# Patient Record
Sex: MALE | Race: WHITE | NOT HISPANIC OR LATINO | Employment: OTHER | RURAL
[De-identification: names, ages, dates, MRNs, and addresses within clinical notes are randomized per-mention and may not be internally consistent; named-entity substitution may affect disease eponyms.]

---

## 2021-10-01 DIAGNOSIS — C67.9 PRIMARY MALIGNANT NEOPLASM OF BLADDER: Primary | ICD-10-CM

## 2021-10-28 ENCOUNTER — PROCEDURE VISIT (OUTPATIENT)
Dept: UROLOGY | Facility: CLINIC | Age: 71
End: 2021-10-28
Payer: MEDICARE

## 2021-10-28 VITALS
WEIGHT: 216 LBS | SYSTOLIC BLOOD PRESSURE: 131 MMHG | DIASTOLIC BLOOD PRESSURE: 81 MMHG | BODY MASS INDEX: 26.86 KG/M2 | HEIGHT: 75 IN | HEART RATE: 77 BPM

## 2021-10-28 DIAGNOSIS — C67.9 PRIMARY MALIGNANT NEOPLASM OF BLADDER: Primary | ICD-10-CM

## 2021-10-28 DIAGNOSIS — Z12.5 SCREENING FOR PROSTATE CANCER: ICD-10-CM

## 2021-10-28 PROCEDURE — 52000 CYSTOURETHROSCOPY: CPT | Mod: PBBFAC | Performed by: UROLOGY

## 2021-10-28 PROCEDURE — 52000 PR CYSTOURETHROSCOPY: ICD-10-PCS | Mod: S$PBB,,, | Performed by: UROLOGY

## 2021-10-28 PROCEDURE — 52000 CYSTOURETHROSCOPY: CPT | Mod: S$PBB,,, | Performed by: UROLOGY

## 2021-10-28 RX ORDER — ASPIRIN 325 MG
325 TABLET ORAL DAILY
COMMUNITY

## 2021-10-28 RX ORDER — LISINOPRIL 20 MG/1
20 TABLET ORAL DAILY
COMMUNITY

## 2021-10-28 RX ORDER — LIDOCAINE HYDROCHLORIDE 20 MG/ML
JELLY TOPICAL
Status: COMPLETED | OUTPATIENT
Start: 2021-10-28 | End: 2021-10-28

## 2021-10-28 RX ORDER — LEVOTHYROXINE SODIUM 137 UG/1
137 TABLET ORAL
COMMUNITY
Start: 2021-04-05

## 2021-10-28 RX ORDER — ROSUVASTATIN CALCIUM 10 MG/1
20 TABLET, COATED ORAL DAILY
COMMUNITY
Start: 2021-09-18

## 2021-10-28 RX ORDER — METFORMIN HYDROCHLORIDE 500 MG/1
1000 TABLET ORAL 2 TIMES DAILY WITH MEALS
COMMUNITY

## 2021-10-28 RX ORDER — CARVEDILOL 6.25 MG/1
6.25 TABLET ORAL 2 TIMES DAILY
COMMUNITY

## 2021-10-28 RX ADMIN — LIDOCAINE HYDROCHLORIDE: 20 JELLY TOPICAL at 01:10

## 2021-10-29 DIAGNOSIS — M47.816 LUMBAR SPONDYLOSIS: Primary | ICD-10-CM

## 2021-11-03 ENCOUNTER — CLINICAL SUPPORT (OUTPATIENT)
Dept: REHABILITATION | Facility: HOSPITAL | Age: 71
End: 2021-11-03
Payer: MEDICARE

## 2021-11-03 DIAGNOSIS — M47.816 LUMBAR SPONDYLOSIS: ICD-10-CM

## 2021-11-03 PROCEDURE — 97110 THERAPEUTIC EXERCISES: CPT | Mod: PN

## 2021-11-03 PROCEDURE — 97161 PT EVAL LOW COMPLEX 20 MIN: CPT | Mod: PN

## 2021-11-12 ENCOUNTER — CLINICAL SUPPORT (OUTPATIENT)
Dept: REHABILITATION | Facility: HOSPITAL | Age: 71
End: 2021-11-12
Payer: MEDICARE

## 2021-11-12 DIAGNOSIS — M47.816 LUMBAR SPONDYLOSIS: Primary | ICD-10-CM

## 2021-11-12 PROCEDURE — 97110 THERAPEUTIC EXERCISES: CPT | Mod: PN,CQ

## 2022-10-31 ENCOUNTER — PROCEDURE VISIT (OUTPATIENT)
Dept: UROLOGY | Facility: CLINIC | Age: 72
End: 2022-10-31
Payer: MEDICARE

## 2022-10-31 VITALS
BODY MASS INDEX: 26.73 KG/M2 | HEIGHT: 75 IN | DIASTOLIC BLOOD PRESSURE: 71 MMHG | WEIGHT: 215 LBS | SYSTOLIC BLOOD PRESSURE: 126 MMHG | HEART RATE: 65 BPM

## 2022-10-31 DIAGNOSIS — C67.9 PRIMARY MALIGNANT NEOPLASM OF BLADDER: Primary | ICD-10-CM

## 2022-10-31 DIAGNOSIS — Z12.5 SCREENING PSA (PROSTATE SPECIFIC ANTIGEN): ICD-10-CM

## 2022-10-31 DIAGNOSIS — N41.1 CHRONIC PROSTATITIS: ICD-10-CM

## 2022-10-31 DIAGNOSIS — N40.1 BENIGN PROSTATIC HYPERPLASIA WITH URINARY OBSTRUCTION: ICD-10-CM

## 2022-10-31 DIAGNOSIS — N13.8 BENIGN PROSTATIC HYPERPLASIA WITH URINARY OBSTRUCTION: ICD-10-CM

## 2022-10-31 PROCEDURE — 52000 CYSTOURETHROSCOPY: CPT | Mod: S$PBB,,, | Performed by: UROLOGY

## 2022-10-31 PROCEDURE — 52000 CYSTOURETHROSCOPY: CPT | Mod: PBBFAC | Performed by: UROLOGY

## 2022-10-31 PROCEDURE — 52000 PR CYSTOURETHROSCOPY: ICD-10-PCS | Mod: S$PBB,,, | Performed by: UROLOGY

## 2022-10-31 RX ORDER — LIDOCAINE HYDROCHLORIDE 20 MG/ML
JELLY TOPICAL
Status: DISCONTINUED | OUTPATIENT
Start: 2022-10-31 | End: 2022-10-31

## 2022-10-31 RX ORDER — LIDOCAINE HYDROCHLORIDE 20 MG/ML
JELLY TOPICAL
Status: COMPLETED | OUTPATIENT
Start: 2022-10-31 | End: 2022-10-31

## 2022-10-31 RX ADMIN — LIDOCAINE HYDROCHLORIDE 10 ML: 20 JELLY TOPICAL at 04:10

## 2022-10-31 NOTE — PROCEDURES
Procedures    History of Present Illness  Mr. Ybarra is 65 years old. Sent to me by Dr. Dean Marrufo because of terminal hematuria that has been intermittent for the last couple months. He has had no previous problems with urinary tract infections. He did receive antibiotics empirically and seemed to improve with symptoms of slight dysuria and hematuria when he was taking them. He saw a little blood a few days after he completed antibiotics and has seen none recently. He has no history of prostatitis. Was found to have a stone on x-ray but has never had a clinical stone problem. He has had bilateral testicular pain or discomfort for the last few months also, that has occurred approximately during the same time he has had the hematuria. This has not been associated necessarily with activity. Sometimes it hurts in both testicles at the same time and other times only on the right side. This is also intermittent pain. No history of other  problems. IPSS score totals 3-4 with nocturia one to 2 times nightly.  [July 2, 2015]     Mr. Ybarra underwent TURBTs on July 9. He did very well following discharge and really did not have any significant bleeding. He started having worsening dysuria sometime after discharge and is still having dysuria. He is also having some pain in the suprapubic area. 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 He is chronically uncomfortable in the area overlying his pubis and has some dysuria each time he voids. Pathology report revealed that his tumors were all grade 1 lesions. For that reason I think we will hold off on BCG until we do his first cystoscopy. It may be that we can get by without BCG yet.  Patient is no longer on Farxiga.  [August 17, 2015]     Rev. Ybarra is in for first cystoscopy since the tumor removal which was done on July 9. All of his lesions were grade 1 lesions without invasion. He is in for cystoscopy and doing well clinically. He is only on  metformin 4 tablets daily and hemoglobin A1c has come down to 6.9. He is not on any additional agent at this time but sugars been doing well. No new health problems.  [October 26, 2015]     Rev. Ybarra is in for the second cystoscopy since the original surgery. He had some small lesions removed last time that were felt to be grade 2 TCC. He's had no visible blood in his urine but has had a little discomfort in the suprapubic area on an intermittent basis for the last few weeks. This could've been related to some strenuous activity. The discomfort is not related to voiding and is not having any real trouble voiding.  [February 1, 2016]     Rev. Ybarra is in for his next cystoscopy and continues to do very well. No visible blood in the urine and no further discomfort in the suprapubic area. He's had no new health issues since his last visit.  [May 23, 2016]     Rev. Ybarra is in for cystoscopy and follow-up because of previous TCC urinary bladder. He has had no visible blood in his urine and has had no increased trouble with urination. No further suprapubic pain. Generally doing well and just in for the cystoscopy.  [September 19, 2016]     Rev. Ybarra has been doing well since he was here in September. In for cystoscopy and follow-up of previous TCC urinary bladder. He had a short bout of suprapubic pain that cleared spontaneously. Otherwise doing well. No visible blood or other suggestion of  problems.  [February 13, 2017]     Rev. Ybarra is in for his 6 month cystoscopy. Clinically doing well without any new health issues. 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 [August 7, 2017]     Rev. Ybarra is in for six-month cystoscopy. He is not aware of any problems. Has not seen any blood in his urine and has had no increased trouble with bladder outlet obstructive symptoms. 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  Overall doing very well.  [February 12, 2018]     Rev. Ybarra is in for six-month follow-up with cystoscopy. He does have a lot of other health issues since he was last seen including thyroid nodule. This was aspirated week ago but he has not heard any results from that aspiration yet. Patient also has had some double vision and scheduled see a neurosurgeon at [facility] for that. No new  complaints. No visible blood in the urine or other problems.  [August 13, 2018]     Rev. Ybarra was brought in for six-month cystoscopic examination. Clinically doing okay with no visible blood in the urine and no worsening bladder outlet obstructive symptoms etc. Only significant health issues he had since he was seen last time was back surgery for a cyst causing pressure on his sciatic nerves. Doing much better with his back now since the surgery was done November 27. Here for cystoscopy. [February 20, 2019]     Rev. Ybarra is doing satisfactory after the bladder tumors were fulgurated on February 28. He is still having some discomfort and symptoms did not clear with Cipro. Still has urgency and has nocturia 2-3 times nightly. He feels that he is still having some problems related to the surgery. No visible blood. [April 4, 2019]     Rev. Ybarra has had no visible blood in the urine and no major dysuria. He is still taking Azo and Prelief as he never stopped either one after he got over the last procedure. No new clinical problems. Here for follow-up cystoscopy. [July 8, 2019]     Rev. Ybarra is in for his 4 month follow-up with cystoscopy. Nothing to suggest active problems with prostate or  to suggest recurrent TCC of bladder. Voiding reasonably well. Also for yearly screening of prostate cancer. [November 11, 2019]     Rev. Ybarra returned today for cystoscopy because of previous TCCs urinary bladder. Clinically doing well without any new issues. [March 19, 2020]     Rev. Ybarra returned today for cystoscopy for follow-up  of previous TCC's of urinary bladder. Doing okay with no visible blood in urine and no new clinical problems. [July 23, 2020]     RevFroy Ybarra returned today for cystoscopy for bladder tumor follow-up. PSA done also. Overall good 6 months without any new problems. Weight is exactly the same as 6 months ago.  [January 28, 2021]     PSA was 1.010 on October 31, 2022  PSA was 0.929 on January 28, 2021  PSA was 0.749 on November 11, 2019  PSA was 0.733 on August 13, 2018  PSA was 1.070 on February 13, 2017  PSA was 1.100 on February 1, 2016  ----------------------------------------------------------------------------------------------------------------------------------------------------------------------------------------------------------------------------------------------------------------------------------------------------------    Cystoscopy  note     Preoperative diagnosis:  Previous transitional cell carcinomas of urinary bladder; for bladder tumor recheck     Postoperative diagnosis:  Same with no  recurrence of TCCs of urinary bladder     Description:  The patient was placed in the supine position in the clinic cystoscopy room and prepared for flexible cystoscopy. Urethra was anesthetized with lidocaine jelly. No sedation was given. The 16.5 Dominican Olympus flexible digital cystoscope was passed transurethrally into the bladder. Anterior urethra was clear. The posterior urethra had early trilobar enlargement with  partial obstruction present. There was a lot of inflammatory change noted involving the whole prostate as previously mentioned.  The prostate was about 3.0 cm total length with the functional length about 2 cm in length. The bladder was entered and the  urine aspirated. The bladder was refilled with sterile water. There was no evidence of tumor recurrence or other significant bladder lesions.  There was a small hemangioma at the dome of the bladder. There were no significant erythematous areas but there  were a few inflammatory looking areas along with the scarring  and I did not see any areas that appeared to be suspicious. Ureteral orifices were in  normal position with bilateral clear efflux. Both orifices appeared that they may have been previously resected.  Retrograde view of the bladder neck revealed minimal intravesical extension of prostatic tissue and orifices appeared normal. No other real abnormalities were noted. The bladder was then viewed with the NBI and I did not see any suspicious lesions. There was less erythematous area adjacent to the area of the original tumor on left anterolateral wall than previously described. Scope was removed.  Digital rectal exam revealed a 20 g symmetrical firm benign gland.  Patient  was allowed to return to a regular exam room in satisfactory condition. There were no complications.      -----------------------------------------------------------------------------------------------------------------------------------------------------------

## 2022-11-01 NOTE — PATIENT INSTRUCTIONS
No recurrence of TCC of urinary bladder.  Prostate feels okay.  PSA  of 1.010 reported to patient before he left the office.  Return in 1 year on Monday November 6, 2023 for PSA and another cystoscopy at 1:30 p.m.  Call for problems.

## 2023-10-10 DIAGNOSIS — C67.9 PRIMARY MALIGNANT NEOPLASM OF BLADDER: Primary | ICD-10-CM

## 2023-11-06 ENCOUNTER — PROCEDURE VISIT (OUTPATIENT)
Dept: UROLOGY | Facility: CLINIC | Age: 73
End: 2023-11-06
Payer: MEDICARE

## 2023-11-06 VITALS
HEART RATE: 80 BPM | SYSTOLIC BLOOD PRESSURE: 112 MMHG | HEIGHT: 75 IN | BODY MASS INDEX: 26.86 KG/M2 | DIASTOLIC BLOOD PRESSURE: 69 MMHG | WEIGHT: 216 LBS

## 2023-11-06 DIAGNOSIS — C67.9 PRIMARY MALIGNANT NEOPLASM OF BLADDER: Primary | ICD-10-CM

## 2023-11-06 DIAGNOSIS — N40.1 BENIGN PROSTATIC HYPERPLASIA WITH URINARY OBSTRUCTION: ICD-10-CM

## 2023-11-06 DIAGNOSIS — N13.8 BENIGN PROSTATIC HYPERPLASIA WITH URINARY OBSTRUCTION: ICD-10-CM

## 2023-11-06 DIAGNOSIS — Z12.5 SCREENING PSA (PROSTATE SPECIFIC ANTIGEN): ICD-10-CM

## 2023-11-06 DIAGNOSIS — N41.1 CHRONIC PROSTATITIS: ICD-10-CM

## 2023-11-06 PROCEDURE — 52000 CYSTOURETHROSCOPY: CPT | Mod: S$PBB,,, | Performed by: UROLOGY

## 2023-11-06 PROCEDURE — 52000 CYSTOURETHROSCOPY: CPT | Mod: PBBFAC | Performed by: UROLOGY

## 2023-11-06 PROCEDURE — 52000 PR CYSTOURETHROSCOPY: ICD-10-PCS | Mod: S$PBB,,, | Performed by: UROLOGY

## 2023-11-06 RX ORDER — LIDOCAINE HYDROCHLORIDE 20 MG/ML
JELLY TOPICAL
Status: COMPLETED | OUTPATIENT
Start: 2023-11-06 | End: 2023-11-06

## 2023-11-06 RX ORDER — ISOSORBIDE MONONITRATE 30 MG/1
30 TABLET, EXTENDED RELEASE ORAL
COMMUNITY
Start: 2023-11-04

## 2023-11-06 RX ORDER — AA/PROT/LYSINE/METHIO/VIT C/B6 50-12.5 MG
TABLET ORAL
COMMUNITY

## 2023-11-06 RX ORDER — OMEPRAZOLE 40 MG/1
CAPSULE, DELAYED RELEASE ORAL
COMMUNITY
Start: 2023-10-17

## 2023-11-06 RX ADMIN — LIDOCAINE HYDROCHLORIDE 10 ML: 20 JELLY TOPICAL at 02:11

## 2023-11-06 NOTE — PROCEDURES
Procedures    History of Present Illness  Mr. Ybarra is 65 years old. Sent to me by Dr. Dean Marrufo because of terminal hematuria that has been intermittent for the last couple months. He has had no previous problems with urinary tract infections. He did receive antibiotics empirically and seemed to improve with symptoms of slight dysuria and hematuria when he was taking them. He saw a little blood a few days after he completed antibiotics and has seen none recently. He has no history of prostatitis. Was found to have a stone on x-ray but has never had a clinical stone problem. He has had bilateral testicular pain or discomfort for the last few months also, that has occurred approximately during the same time he has had the hematuria. This has not been associated necessarily with activity. Sometimes it hurts in both testicles at the same time and other times only on the right side. This is also intermittent pain. No history of other  problems. IPSS score totals 3-4 with nocturia one to 2 times nightly.  [July 2, 2015]     Mr. Ybarra underwent TURBTs on July 9. He did very well following discharge and really did not have any significant bleeding. He started having worsening dysuria sometime after discharge and is still having dysuria. He is also having some pain in the suprapubic area. 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 He is chronically uncomfortable in the area overlying his pubis and has some dysuria each time he voids. Pathology report revealed that his tumors were all grade 1 lesions. For that reason I think we will hold off on BCG until we do his first cystoscopy. It may be that we can get by without BCG yet.  Patient is no longer on Farxiga.  [August 17, 2015]     Rev. Ybarra is in for first cystoscopy since the tumor removal which was done on July 9. All of his lesions were grade 1 lesions without invasion. He is in for cystoscopy and doing well clinically. He is only on  metformin 4 tablets daily and hemoglobin A1c has come down to 6.9. He is not on any additional agent at this time but sugars been doing well. No new health problems.  [October 26, 2015]     Rev. Ybarra is in for the second cystoscopy since the original surgery. He had some small lesions removed last time that were felt to be grade 2 TCC. He's had no visible blood in his urine but has had a little discomfort in the suprapubic area on an intermittent basis for the last few weeks. This could've been related to some strenuous activity. The discomfort is not related to voiding and is not having any real trouble voiding.  [February 1, 2016]     Rev. Ybarra is in for his next cystoscopy and continues to do very well. No visible blood in the urine and no further discomfort in the suprapubic area. He's had no new health issues since his last visit.  [May 23, 2016]     Rev. Ybarra is in for cystoscopy and follow-up because of previous TCC urinary bladder. He has had no visible blood in his urine and has had no increased trouble with urination. No further suprapubic pain. Generally doing well and just in for the cystoscopy.  [September 19, 2016]     Rev. Ybarra has been doing well since he was here in September. In for cystoscopy and follow-up of previous TCC urinary bladder. He had a short bout of suprapubic pain that cleared spontaneously. Otherwise doing well. No visible blood or other suggestion of  problems.  [February 13, 2017]     Rev. Ybarra is in for his 6 month cystoscopy. Clinically doing well without any new health issues. 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 [August 7, 2017]     Rev. Ybarra is in for six-month cystoscopy. He is not aware of any problems. Has not seen any blood in his urine and has had no increased trouble with bladder outlet obstructive symptoms. 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  Overall doing very well.  [February 12, 2018]     Rev. Ybarra is in for six-month follow-up with cystoscopy. He does have a lot of other health issues since he was last seen including thyroid nodule. This was aspirated week ago but he has not heard any results from that aspiration yet. Patient also has had some double vision and scheduled see a neurosurgeon at [facility] for that. No new  complaints. No visible blood in the urine or other problems.  [August 13, 2018]     Rev. Ybarra was brought in for six-month cystoscopic examination. Clinically doing okay with no visible blood in the urine and no worsening bladder outlet obstructive symptoms etc. Only significant health issues he had since he was seen last time was back surgery for a cyst causing pressure on his sciatic nerves. Doing much better with his back now since the surgery was done November 27. Here for cystoscopy. [February 20, 2019]     Rev. Ybarra is doing satisfactory after the bladder tumors were fulgurated on February 28. He is still having some discomfort and symptoms did not clear with Cipro. Still has urgency and has nocturia 2-3 times nightly. He feels that he is still having some problems related to the surgery. No visible blood. [April 4, 2019]     Rev. Ybarra has had no visible blood in the urine and no major dysuria. He is still taking Azo and Prelief as he never stopped either one after he got over the last procedure. No new clinical problems. Here for follow-up cystoscopy. [July 8, 2019]     Rev. Ybarra is in for his 4 month follow-up with cystoscopy. Nothing to suggest active problems with prostate or  to suggest recurrent TCC of bladder. Voiding reasonably well. Also for yearly screening of prostate cancer. [November 11, 2019]     Rev. Ybarra returned today for cystoscopy because of previous TCCs urinary bladder. Clinically doing well without any new issues. [March 19, 2020]     Rev. Ybarra returned today for cystoscopy for follow-up  of previous TCC's of urinary bladder. Doing okay with no visible blood in urine and no new clinical problems. [July 23, 2020]     Rev. Ybarra returned today for cystoscopy for bladder tumor follow-up. PSA done also. Overall good 6 months without any new problems. Weight is exactly the same as 6 months ago.  [January 28, 2021]    Reverend Ybarra is had a good year without any hematuria or worsening bladder outlet obstructive symptoms.  He is voiding as well as a year ago seemingly.  Had PSA drawn and results stable.  [November 6, 2023]         PSA was 1.050 on November 6, 2023  PSA was 1.010 on October 31, 2022  PSA was 0.929 on January 28, 2021  PSA was 0.749 on November 11, 2019  PSA was 0.733 on August 13, 2018  PSA was 1.070 on February 13, 2017  PSA was 1.100 on February 1, 2016  ----------------------------------------------------------------------------------------------------------------------------------------------------------------------------------------------------------------------------------------------------------------------------------------------------------     Cystoscopy  note     Preoperative diagnosis:  Previous transitional cell carcinomas of urinary bladder; for bladder tumor recheck     Postoperative diagnosis:  Same with no  recurrence of TCCs of urinary bladder     Description:  The patient was placed in the supine position in the clinic cystoscopy room and prepared for flexible cystoscopy. Urethra was anesthetized with lidocaine jelly. No sedation was given. The 16.5 [place] Olympus flexible digital cystoscope was passed transurethrally into the bladder. Anterior urethra was clear. The posterior urethra had early trilobar enlargement with  partial obstruction present. There was a lot of inflammatory change noted involving the whole prostate as previously mentioned.  The prostate was about 3.0 cm total length with the functional length about 2 cm. The bladder was entered and the  urine  aspirated. The bladder was refilled with sterile water. There was no evidence of tumors  or other significant bladder lesions.  There was a small hemangioma at the dome of the bladder and a couple of other small hemangiomas.  There was lot of scarring of the bladder from previous tumor removal. There were no significant erythematous areas but there were a few inflammatory looking areas along with the scarring  and I did not see any areas that appeared to be suspicious. Ureteral orifices were in  normal position with bilateral clear efflux. Both orifices appeared that they may have been previously resected.  Retrograde view of the bladder neck revealed minimal intravesical extension of prostatic tissue and orifices appeared normal. No other real abnormalities were noted. The bladder was then viewed with the NBI and I did not see any suspicious lesions. Scope was removed.   Patient  was allowed to return to a regular exam room in satisfactory condition. There were no complications.

## 2023-11-06 NOTE — PATIENT INSTRUCTIONS
Cystoscopy as described.  No recurrence of TCC.  PSA returned before patient left the office and reported to him as 1.050.  Patient given Cipro 500 mg b.i.d. for 3 days to cover instrumentation.  Recommend repeat cystoscopy in 1 year.  Patient understands I plan to retire next year and at this point can not schedule him with anyone else as we do not have urologist lined up yet.  He will call next year for scheduling of cystoscopy for follow-up of previous TCC.

## 2025-04-25 DIAGNOSIS — C67.9 PRIMARY MALIGNANT NEOPLASM OF BLADDER: Primary | ICD-10-CM

## 2025-05-20 DIAGNOSIS — Z12.5 SCREENING PSA (PROSTATE SPECIFIC ANTIGEN): Primary | ICD-10-CM

## 2025-05-28 ENCOUNTER — PROCEDURE VISIT (OUTPATIENT)
Dept: UROLOGY | Facility: CLINIC | Age: 75
End: 2025-05-28
Payer: MEDICARE

## 2025-05-28 VITALS
DIASTOLIC BLOOD PRESSURE: 65 MMHG | HEART RATE: 57 BPM | OXYGEN SATURATION: 95 % | WEIGHT: 216 LBS | BODY MASS INDEX: 26.86 KG/M2 | HEIGHT: 75 IN | SYSTOLIC BLOOD PRESSURE: 132 MMHG

## 2025-05-28 DIAGNOSIS — N13.8 BENIGN PROSTATIC HYPERPLASIA WITH URINARY OBSTRUCTION: ICD-10-CM

## 2025-05-28 DIAGNOSIS — N40.1 BENIGN PROSTATIC HYPERPLASIA WITH URINARY OBSTRUCTION: ICD-10-CM

## 2025-05-28 DIAGNOSIS — C67.9 PRIMARY MALIGNANT NEOPLASM OF BLADDER: Primary | ICD-10-CM

## 2025-05-28 DIAGNOSIS — R10.9 FLANK PAIN: ICD-10-CM

## 2025-05-28 DIAGNOSIS — N20.0 NEPHROLITHIASIS: ICD-10-CM

## 2025-05-28 DIAGNOSIS — N41.1 CHRONIC PROSTATITIS: ICD-10-CM

## 2025-05-28 PROCEDURE — 99214 OFFICE O/P EST MOD 30 MIN: CPT | Mod: S$PBB,,, | Performed by: UROLOGY

## 2025-05-28 RX ORDER — SITAGLIPTIN 100 MG/1
1 TABLET ORAL DAILY
COMMUNITY
Start: 2025-01-09

## 2025-05-28 RX ORDER — RANOLAZINE 1000 MG/1
1000 TABLET, EXTENDED RELEASE ORAL 2 TIMES DAILY
COMMUNITY
Start: 2025-03-16

## 2025-05-28 NOTE — PROCEDURES
Assessment:   1. Primary malignant neoplasm of bladder  -     Cystoscopy  -     Basic Metabolic Panel; Future; Expected date: 05/28/2025  -     CT Abdomen Pelvis W Wo Contrast; Future; Expected date: 05/28/2025    2. Flank pain  -     CT Abdomen Pelvis W Wo Contrast; Future; Expected date: 05/28/2025    3. Nephrolithiasis  -     Basic Metabolic Panel; Future; Expected date: 05/28/2025  -     CT Abdomen Pelvis W Wo Contrast; Future; Expected date: 05/28/2025    4. Chronic prostatitis  -     PSA, Total (Diagnostic); Future; Expected date: 05/28/2025    5. Benign prostatic hyperplasia with urinary obstruction  -     PSA, Total (Diagnostic); Future; Expected date: 05/28/2025         Plan:     We discussed the patient's previous recurrent bladder cancer and pathology and prior cystoscopies and discussed his previous history of resected ureteral orifices and recent diagnosis of suspected ureterolithiasis with the intermittent flank pain and I have recommended the performance of a CT urogram with blood work today.     Plan:  The patient is not having any flank pain currently and no gross hematuria but has a history of recurrent bladder cancer and previous resection of the ureteral orifices and have ordered a CT urogram with blood work today.  Plan to have the patient obtain the CT urogram and reschedule cystoscopy after imaging.             Rivera Dunbar MD  Urology  05/28/2025          UROLOGY HISTORY AND PHYSICAL EXAM    Subjective:      Patient ID: Rolan Ybarra is a 75 y.o. male.    Chief Complaint::   HPI    The patient is a 75-year-old male with a history of recurrent papillary bladder cancer that presents today after being lost to follow up reporting several months' history of right-sided flank pain.  He reports that he is not having any pain currently.  He reports that he was diagnosed with a ureteral stone and was treated with pain medicine and a reports since that diagnosis in March that he has had 2  episodes of flank pain that lasted for several hours and required narcotics.  He reports he is not having any gross hematuria.     Past Medical History:   Diagnosis Date    Chronic prostatitis     Diabetes mellitus     Hypertension     Primary malignant neoplasm of bladder     Spinal stenosis      Past Surgical History:   Procedure Laterality Date    BACK SURGERY      CORONARY ARTERY BYPASS GRAFT      HERNIA REPAIR      ROTATOR CUFF REPAIR Left         Medications Ordered Prior to Encounter[1]     Review of patient's allergies indicates:  No Known Allergies  Vitals:    05/28/25 1320   BP: 132/65   Pulse: (!) 57          Review of Systems   Constitutional:  Negative for activity change.   Cardiovascular:  Negative for chest pain and leg swelling.   Gastrointestinal:  Negative for abdominal distention and vomiting.   Genitourinary:  Negative for decreased urine volume, flank pain and hematuria.   Musculoskeletal:  Positive for arthralgias. Negative for back pain.   Skin:  Negative for wound.        Objective:     Physical Exam  Vitals and nursing note reviewed.   Constitutional:       Appearance: Normal appearance. He is normal weight.   HENT:      Head: Normocephalic and atraumatic.   Eyes:      General: No scleral icterus.     Conjunctiva/sclera: Conjunctivae normal.   Cardiovascular:      Rate and Rhythm: Normal rate.   Pulmonary:      Effort: No respiratory distress.      Breath sounds: No wheezing.   Abdominal:      General: There is no distension.      Palpations: Abdomen is soft. There is no mass.      Tenderness: There is no abdominal tenderness.   Musculoskeletal:         General: No deformity.   Skin:     General: Skin is warm and dry.      Findings: No rash.   Neurological:      General: No focal deficit present.      Mental Status: He is alert.   Psychiatric:         Mood and Affect: Mood normal.         Behavior: Behavior normal.         Thought Content: Thought content normal.         Judgment: Judgment  normal.          Lab Results   Component Value Date    PSA 1.050 11/06/2023    PSA 1.010 10/31/2022               [1]   Current Outpatient Medications on File Prior to Visit   Medication Sig Dispense Refill    aspirin 325 MG tablet Take 325 mg by mouth once daily.      carvediloL (COREG) 6.25 MG tablet Take 6.25 mg by mouth 2 (two) times daily.      levothyroxine (SYNTHROID) 137 MCG Tab tablet Take 137 mcg by mouth before breakfast.      lisinopriL (PRINIVIL,ZESTRIL) 20 MG tablet Take 20 mg by mouth once daily.      magnesium oxide-Mg AA chelate (MG-PLUS-PROTEIN) 133 mg Tab Take by mouth.      metFORMIN (GLUCOPHAGE) 500 MG tablet Take 1,000 mg by mouth 2 (two) times daily with meals.      ranolazine (RANEXA) 1,000 mg Tb12 Take 1,000 mg by mouth 2 (two) times daily.      rosuvastatin (CRESTOR) 10 MG tablet Take 20 mg by mouth once daily.      ZITUVIO 100 mg Tab Take 1 tablet by mouth once daily.      isosorbide mononitrate (IMDUR) 30 MG 24 hr tablet Take 30 mg by mouth. (Patient not taking: Reported on 5/28/2025)      omeprazole (PRILOSEC) 40 MG capsule TAKE ONE CAPSULE BY MOUTH 30 minutes BEFORE morning meal (Patient not taking: Reported on 5/28/2025)      SITagliptin (JANUVIA) 100 MG Tab Take 100 mg by mouth once daily. (Patient not taking: Reported on 5/28/2025)       No current facility-administered medications on file prior to visit.

## 2025-06-17 ENCOUNTER — HOSPITAL ENCOUNTER (OUTPATIENT)
Dept: RADIOLOGY | Facility: HOSPITAL | Age: 75
Discharge: HOME | End: 2025-06-17
Attending: UROLOGY
Payer: MEDICARE

## 2025-06-17 DIAGNOSIS — C67.9 PRIMARY MALIGNANT NEOPLASM OF BLADDER: ICD-10-CM

## 2025-06-17 DIAGNOSIS — R10.9 FLANK PAIN: ICD-10-CM

## 2025-06-17 DIAGNOSIS — N20.0 NEPHROLITHIASIS: ICD-10-CM

## 2025-06-17 PROCEDURE — 74178 CT ABD&PLV WO CNTR FLWD CNTR: CPT | Mod: TC

## 2025-06-17 PROCEDURE — 74178 CT ABD&PLV WO CNTR FLWD CNTR: CPT | Mod: 26,,, | Performed by: INTERNAL MEDICINE

## 2025-06-17 PROCEDURE — 25500020 PHARM REV CODE 255: Performed by: UROLOGY

## 2025-06-17 RX ORDER — IOPAMIDOL 755 MG/ML
100 INJECTION, SOLUTION INTRAVASCULAR
Status: COMPLETED | OUTPATIENT
Start: 2025-06-17 | End: 2025-06-17

## 2025-06-17 RX ADMIN — IOPAMIDOL 125 ML: 755 INJECTION, SOLUTION INTRAVENOUS at 08:06

## 2025-07-01 ENCOUNTER — TELEPHONE (OUTPATIENT)
Dept: UROLOGY | Facility: CLINIC | Age: 75
End: 2025-07-01
Payer: MEDICARE

## 2025-07-01 DIAGNOSIS — N41.1 CHRONIC PROSTATITIS: ICD-10-CM

## 2025-07-01 DIAGNOSIS — C67.9 PRIMARY MALIGNANT NEOPLASM OF BLADDER: Primary | ICD-10-CM

## 2025-07-23 ENCOUNTER — PROCEDURE VISIT (OUTPATIENT)
Dept: UROLOGY | Facility: CLINIC | Age: 75
End: 2025-07-23
Payer: MEDICARE

## 2025-07-23 VITALS
BODY MASS INDEX: 25.61 KG/M2 | SYSTOLIC BLOOD PRESSURE: 115 MMHG | DIASTOLIC BLOOD PRESSURE: 62 MMHG | HEIGHT: 75 IN | WEIGHT: 206 LBS | OXYGEN SATURATION: 95 % | HEART RATE: 76 BPM

## 2025-07-23 DIAGNOSIS — N41.1 CHRONIC PROSTATITIS: ICD-10-CM

## 2025-07-23 DIAGNOSIS — C67.9 PRIMARY MALIGNANT NEOPLASM OF BLADDER: ICD-10-CM

## 2025-07-23 PROCEDURE — 99499 UNLISTED E&M SERVICE: CPT | Mod: S$PBB,,, | Performed by: UROLOGY

## 2025-07-23 PROCEDURE — 52000 CYSTOURETHROSCOPY: CPT | Mod: PBBFAC | Performed by: UROLOGY

## 2025-07-23 PROCEDURE — 52000 CYSTOURETHROSCOPY: CPT | Mod: S$PBB,,, | Performed by: UROLOGY

## 2025-07-23 NOTE — PROCEDURES
Office Cystoscopy Procedure Note    Date of Procedure: 07/23/2025    Indication:  Urothelial carcinoma of the bladder      Informed consent:  The risks, benefits, complications, treatment options, and expected outcomes were discussed with the patient. The patient concurred with the proposed plan and provided informed consent.     Anesthesia: Lidocaine jelly 2%          Procedure:  The patient was placed in the lithotomy position, was prepped and draped in the usual manner using sterile technique, and 2% lidocaine jelly instilled into the urethra.  A 17 F flexible cystoscope was then inserted into the urethra and the urethra and bladder carefully examined.  The following findings were noted:       Findings:   Urethra:  Normal; no strictures in the pendulous urethra or verbal urethral    Prostate:  3 cm; trilobar obstruction; mild intravesical growth    Bladder:  Evidence of previous TUR surgery with well-healed scars; no abnormal tumors; no abnormal erythematous lesions; grade 2 trabeculations; no cellules; no diverticulae    Ureteral orifices:       -Right: Normal       -Left: Normal     Other findings:     None        Specimens: None                   Complications: None; patient tolerated the procedure well            Disposition: Home after brief observation     Condition: Stable     Plan:  Continue surveillance.  Repeat cystoscopy in 12 months.           Rivera Dunbar MD